# Patient Record
Sex: MALE | Race: WHITE
[De-identification: names, ages, dates, MRNs, and addresses within clinical notes are randomized per-mention and may not be internally consistent; named-entity substitution may affect disease eponyms.]

---

## 2017-04-04 ENCOUNTER — HOSPITAL ENCOUNTER (OUTPATIENT)
Dept: HOSPITAL 45 - C.LAB1850 | Age: 67
Discharge: HOME | End: 2017-04-04
Attending: INTERNAL MEDICINE
Payer: COMMERCIAL

## 2017-04-04 DIAGNOSIS — N40.0: Primary | ICD-10-CM

## 2017-04-04 DIAGNOSIS — Z13.220: ICD-10-CM

## 2017-04-04 DIAGNOSIS — D69.6: ICD-10-CM

## 2017-04-04 DIAGNOSIS — N39.41: ICD-10-CM

## 2017-04-04 DIAGNOSIS — E11.9: ICD-10-CM

## 2017-04-04 LAB
ALBUMIN/GLOB SERPL: 1.2 {RATIO} (ref 0.9–2)
ALP SERPL-CCNC: 51 U/L (ref 45–117)
ALT SERPL-CCNC: 22 U/L (ref 12–78)
ANION GAP SERPL CALC-SCNC: 7 MMOL/L (ref 3–11)
AST SERPL-CCNC: 14 U/L (ref 15–37)
BASOPHILS # BLD: 0.04 K/UL (ref 0–0.2)
BASOPHILS NFR BLD: 0.7 %
BUN SERPL-MCNC: 26 MG/DL (ref 7–18)
BUN/CREAT SERPL: 23.5 (ref 10–20)
CALCIUM SERPL-MCNC: 8.6 MG/DL (ref 8.5–10.1)
CHLORIDE SERPL-SCNC: 110 MMOL/L (ref 98–107)
CHOLEST/HDLC SERPL: 3.1 {RATIO}
CO2 SERPL-SCNC: 26 MMOL/L (ref 21–32)
COMPLETE: YES
CREAT SERPL-MCNC: 1.1 MG/DL (ref 0.6–1.4)
EOSINOPHIL NFR BLD AUTO: 104 K/UL (ref 130–400)
EST. AVERAGE GLUCOSE BLD GHB EST-MCNC: 117 MG/DL
GLOBULIN SER-MCNC: 3.2 GM/DL (ref 2.5–4)
GLUCOSE SERPL-MCNC: 113 MG/DL (ref 70–99)
GLUCOSE UR QL: 54 MG/DL
HCT VFR BLD CALC: 41 % (ref 42–52)
IG%: 0.2 %
IMM GRANULOCYTES NFR BLD AUTO: 24.3 %
KETONES UR QL STRIP: 95 MG/DL
LYMPHOCYTES # BLD: 1.34 K/UL (ref 1.2–3.4)
MCH RBC QN AUTO: 36 PG (ref 25–34)
MCHC RBC AUTO-ENTMCNC: 34.4 G/DL (ref 32–36)
MCV RBC AUTO: 104.6 FL (ref 80–100)
MONOCYTES NFR BLD: 14.1 %
NEUTROPHILS # BLD AUTO: 7.4 %
NEUTROPHILS NFR BLD AUTO: 53.3 %
NITRITE UR QL STRIP: 81 MG/DL (ref 0–150)
PH UR: 165 MG/DL (ref 0–200)
PMV BLD AUTO: 11.1 FL (ref 7.4–10.4)
POTASSIUM SERPL-SCNC: 4.3 MMOL/L (ref 3.5–5.1)
PSA SERPL-MCNC: 0.42 NG/ML (ref 0–4)
RBC # BLD AUTO: 3.92 M/UL (ref 4.7–6.1)
SODIUM SERPL-SCNC: 143 MMOL/L (ref 136–145)
VERY LOW DENSITY LIPOPROT CALC: 16 MG/DL
WBC # BLD AUTO: 5.52 K/UL (ref 4.8–10.8)

## 2017-04-10 ENCOUNTER — HOSPITAL ENCOUNTER (OUTPATIENT)
Dept: HOSPITAL 45 - C.RDSM | Age: 67
Discharge: HOME | End: 2017-04-10
Attending: ORTHOPAEDIC SURGERY
Payer: COMMERCIAL

## 2017-04-10 DIAGNOSIS — M25.562: Primary | ICD-10-CM

## 2017-10-13 ENCOUNTER — HOSPITAL ENCOUNTER (OUTPATIENT)
Dept: HOSPITAL 45 - C.LAB1850 | Age: 67
Discharge: HOME | End: 2017-10-13
Attending: INTERNAL MEDICINE
Payer: COMMERCIAL

## 2017-10-13 DIAGNOSIS — G62.9: ICD-10-CM

## 2017-10-13 DIAGNOSIS — R73.03: Primary | ICD-10-CM

## 2017-10-13 LAB
ALBUMIN/GLOB SERPL: 1.2 {RATIO} (ref 0.9–2)
ALP SERPL-CCNC: 75 U/L (ref 45–117)
ALT SERPL-CCNC: 44 U/L (ref 12–78)
ANION GAP SERPL CALC-SCNC: 9 MMOL/L (ref 3–11)
AST SERPL-CCNC: 32 U/L (ref 15–37)
BASOPHILS # BLD: 0.14 K/UL (ref 0–0.2)
BASOPHILS NFR BLD: 2.3 %
BUN SERPL-MCNC: 20 MG/DL (ref 7–18)
BUN/CREAT SERPL: 18.4 (ref 10–20)
CALCIUM SERPL-MCNC: 9.6 MG/DL (ref 8.5–10.1)
CHLORIDE SERPL-SCNC: 109 MMOL/L (ref 98–107)
CO2 SERPL-SCNC: 23 MMOL/L (ref 21–32)
COMPLETE: YES
CREAT SERPL-MCNC: 1.1 MG/DL (ref 0.6–1.4)
EOSINOPHIL NFR BLD AUTO: 147 K/UL (ref 130–400)
EST. AVERAGE GLUCOSE BLD GHB EST-MCNC: 114 MG/DL
GLOBULIN SER-MCNC: 3.4 GM/DL (ref 2.5–4)
GLUCOSE SERPL-MCNC: 103 MG/DL (ref 70–99)
HCT VFR BLD CALC: 42.7 % (ref 42–52)
IG%: 0.8 %
IMM GRANULOCYTES NFR BLD AUTO: 26.1 %
LYMPHOCYTES # BLD: 1.59 K/UL (ref 1.2–3.4)
MCH RBC QN AUTO: 35.8 PG (ref 25–34)
MCHC RBC AUTO-ENTMCNC: 33.5 G/DL (ref 32–36)
MCV RBC AUTO: 107 FL (ref 80–100)
MONOCYTES NFR BLD: 10.2 %
NEUTROPHILS # BLD AUTO: 6.9 %
NEUTROPHILS NFR BLD AUTO: 53.7 %
PMV BLD AUTO: 10.5 FL (ref 7.4–10.4)
POTASSIUM SERPL-SCNC: 4.2 MMOL/L (ref 3.5–5.1)
RBC # BLD AUTO: 3.99 M/UL (ref 4.7–6.1)
SODIUM SERPL-SCNC: 141 MMOL/L (ref 136–145)
WBC # BLD AUTO: 6.1 K/UL (ref 4.8–10.8)

## 2018-01-10 ENCOUNTER — HOSPITAL ENCOUNTER (EMERGENCY)
Dept: HOSPITAL 45 - C.EDB | Age: 68
Discharge: HOME | End: 2018-01-10
Payer: COMMERCIAL

## 2018-01-10 VITALS — DIASTOLIC BLOOD PRESSURE: 90 MMHG | SYSTOLIC BLOOD PRESSURE: 155 MMHG | OXYGEN SATURATION: 96 % | HEART RATE: 87 BPM

## 2018-01-10 VITALS
BODY MASS INDEX: 30.34 KG/M2 | WEIGHT: 249.12 LBS | HEIGHT: 75.98 IN | WEIGHT: 249.12 LBS | HEIGHT: 75.98 IN | BODY MASS INDEX: 30.34 KG/M2

## 2018-01-10 VITALS — TEMPERATURE: 97.7 F

## 2018-01-10 DIAGNOSIS — Z87.891: ICD-10-CM
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
## 2018-01-10 DIAGNOSIS — E11.9: ICD-10-CM

## 2018-01-10 DIAGNOSIS — S01.01XA: Primary | ICD-10-CM

## 2018-01-10 DIAGNOSIS — F10.920: ICD-10-CM

## 2018-01-10 LAB
ALBUMIN SERPL-MCNC: 4.2 GM/DL (ref 3.4–5)
ALP SERPL-CCNC: 80 U/L (ref 45–117)
ALT SERPL-CCNC: 77 U/L (ref 12–78)
AST SERPL-CCNC: 37 U/L (ref 15–37)
BASOPHILS # BLD: 0.17 K/UL (ref 0–0.2)
BASOPHILS NFR BLD: 1.4 %
BUN SERPL-MCNC: 18 MG/DL (ref 7–18)
CALCIUM SERPL-MCNC: 9.3 MG/DL (ref 8.5–10.1)
CO2 SERPL-SCNC: 26 MMOL/L (ref 21–32)
CREAT SERPL-MCNC: 1.33 MG/DL (ref 0.6–1.4)
EOS ABS #: 0.45 K/UL (ref 0–0.5)
EOSINOPHIL NFR BLD AUTO: 161 K/UL (ref 130–400)
GLUCOSE SERPL-MCNC: 121 MG/DL (ref 70–99)
HCT VFR BLD CALC: 46.3 % (ref 42–52)
HGB BLD-MCNC: 15.7 G/DL (ref 14–18)
IG#: 0.16 K/UL (ref 0–0.02)
IMM GRANULOCYTES NFR BLD AUTO: 20.8 %
LYMPHOCYTES # BLD: 2.54 K/UL (ref 1.2–3.4)
MCH RBC QN AUTO: 37.2 PG (ref 25–34)
MCHC RBC AUTO-ENTMCNC: 33.9 G/DL (ref 32–36)
MCV RBC AUTO: 109.7 FL (ref 80–100)
MONO ABS #: 0.47 K/UL (ref 0.11–0.59)
MONOCYTES NFR BLD: 3.9 %
NEUT ABS #: 8.4 K/UL (ref 1.4–6.5)
NEUTROPHILS # BLD AUTO: 3.7 %
NEUTROPHILS NFR BLD AUTO: 68.9 %
PMV BLD AUTO: 10.7 FL (ref 7.4–10.4)
POTASSIUM SERPL-SCNC: 4.2 MMOL/L (ref 3.5–5.1)
PROT SERPL-MCNC: 8.2 GM/DL (ref 6.4–8.2)
RED CELL DISTRIBUTION WIDTH CV: 14.7 % (ref 11.5–14.5)
RED CELL DISTRIBUTION WIDTH SD: 59.2 FL (ref 36.4–46.3)
SODIUM SERPL-SCNC: 143 MMOL/L (ref 136–145)
WBC # BLD AUTO: 12.19 K/UL (ref 4.8–10.8)

## 2018-01-10 NOTE — EMERGENCY ROOM VISIT NOTE
History


Report prepared by Ana Maria:  Kristina Beltrán


Under the Supervision of:  Dr. Carmina Soler D.O.


First contact with patient:  00:53


Chief Complaint:  FALL


Stated Complaint:  FALL





History of Present Illness


The patient is a 67 year old male who presents to the Emergency Room with 

complaints of an episode of a fall occurring last night. The patient states 

that he fell while going for his daily walk 5 hours ago. He states that he lost 

his footing and couldn't get up because he was too weak. He reports that he 

called his wife to help him get up. The wife states that he was able to walk 

home. She states that she was shocked that he had fallen, but notes he had 

fallen out of bed a month ago. She notes that he was fine, but had skinned his 

knees up. The patient states that he felt fine all day and did not feel weak. 

The wife notes that the patient seemed fine after the fall, but fell again two 

hours ago. She states that he had gotten up from watching TV to use the 

restroom and fell while on the toilet. She reports that she tried to get him up

, but she couldn't this time. The patient denies ever falling off the toilet 

before. He states that he is not sure why he fell off and doesn't remember any 

of it. He notes that he thinks he may have lost consciousness since he hit his 

head so hard. His wife states that if he did it was very brief because she 

heard him fall and immediately went to his aid. She states that he was 

conscious when she arrived. The patient complains of feeling fatigued lately. 

The patient denies walking with a walker, nausea, vomiting, chest pain, 

shortness of breath, and drinking alcohol.





   Source of History:  patient, spouse/significant other


   Onset:  last night


   Position:  other (global)


   Quality:  other (global)


   Timing:  other (episode)


   Associated Symptoms:  + LOC, + fatigue, + weakness, No chest pain, No SOB, 

No nausea, No vomiting





Review of Systems


See HPI for pertinent positives & negatives. A total of 10 systems reviewed and 

were otherwise negative.





Past Medical & Surgical


Medical Problems:


(1) Depression


(2) Diabetes








Family History





No pertinent family history





Social History


Smoking Status:  Former Smoker


Marital Status:  


Housing Status:  lives with significant other





Current/Historical Medications


Scheduled


Dutasteride (Avodart), 0.5 MG PO DAILY


Simvastatin (Zocor), 20 MG PO QPM





Allergies


Coded Allergies:  


     Adhesives (Verified  Allergy, Mild, BANDAIDS - RED ITCH SKIN, 1/10/18)


     Animal Dander (Verified  Allergy, Unknown, HORSE - UNKNOWN, 1/10/18)


     Horse-derived Products (Verified  Allergy, Unknown, UNKNOWN, 1/10/18)


     Latex (Verified  Allergy, Unknown, UNKNOWN, 1/10/18)





Physical Exam


Vital Signs











  Date Time  Temp Pulse Resp B/P (MAP) Pulse Ox O2 Delivery O2 Flow Rate FiO2


 


1/10/18 04:58  87 15 155/90 96   


 


1/10/18 04:06  84      


 


1/10/18 04:01    147/79    


 


1/10/18 04:00  86 15  96   


 


1/10/18 03:31    129/73    


 


1/10/18 03:01    143/75    


 


1/10/18 03:00  87 18  95   


 


1/10/18 02:31    145/83    


 


1/10/18 02:01  89 18 140/89 96 Room Air  


 


1/10/18 02:01    140/89    


 


1/10/18 02:00  86 18  97   


 


1/10/18 00:54  91      


 


1/10/18 00:47 36.5 93 18 151/78 96 Room Air  











Physical Exam


Gen.: The patient is pleasant but seems to smell of alcohol.


HEENT: Head - normocephalic. Large 4 cm laceration on the left parietal region 

of his scalp.  Pupils are equal, round, and reactive to light. Extraocular eye 

muscles are intact and sclera are anicteric.  Ears -  bilaterally patent canals 

with no evidence of hemotympanum.  Nose -  moist nasal mucosa without evidence 

of trauma or discharge. Mouth - moist buccal mucosa with no trauma to the teeth 

or signs of malocclusion.


Neck:  The neck is supple and there is no pain to palpation over the posterior 

cervical spine and no obvious step-offs or deformities.  There is no JVD or 

tracheal deviation.


Chest: There are no signs of deformities, contusions or abrasions to the chest 

wall.  There is no obvious crepitus or paradoxical chest rise.


Heart: Regular, rate, and rhythm.  There is a normal S1 and S2 with no murmurs, 

clicks, or gallops appreciated.


Lungs: Clear to auscultation bilaterally with no wheezes, rales, or rhonchi.


Abdomen: Soft, completely nontender, nondistended, with good bowel sounds.  

There is no sign of trauma such as contusions, abrasions or penetrations.  

There are no palpable pulsatile masses or hepatosplenomegaly.  There is no 

guarding, rigidity, or rebound noted.


Pelvis: Stable to rock and compression.


Extremities: The patient has abrasions to both knees.  There are easily 

palpable peripheral pulses.


Neuro: The patient is awake and alert and easily able to follow commands.  

Muscle strength is 5 out of 5 in all 4 extremities.  Otherwise, neuro exam is 

unremarkable. Slow to answer questions.


Back:  The entire thoracic, lumbar, and sacral spine were palpated.  There are 

no obvious step-offs or deformities noted.  There are no obvious signs of 

trauma such as contusions abrasions penetrations noted to the back.





Medical Decision & Procedures


ER Provider


Diagnostic Interpretation:


Radiology results as stated below per my review and the radiologist's 

interpretation: 





CT HEAD:





No acute intracranial abnormality identified.





Chronic small vessel ischemic disease and cerebral column loss





Bilateral lens implants.





Polyp versus mucous retention cyst in the right maxillary sinus. Mild mucosal 

thickening in the sphenoid sinuses, ethmoid air cells, and frontal sinuses.





Atherosclerotic calcifications in the intracranial vasculature.





Radiologist: Natan Ortiz MD





Study ready at 02:17 and initial results transmitted at 02:53.





Laboratory Results


1/10/18 00:43








Red Blood Count 4.22, Mean Corpuscular Volume 109.7, Mean Corpuscular 

Hemoglobin 37.2, Mean Corpuscular Hemoglobin Concent 33.9, Mean Platelet Volume 

10.7, Neutrophils (%) (Auto) 68.9, Lymphocytes (%) (Auto) 20.8, Monocytes (%) (

Auto) 3.9, Eosinophils (%) (Auto) 3.7, Basophils (%) (Auto) 1.4, Neutrophils # (

Auto) 8.40, Lymphocytes # (Auto) 2.54, Monocytes # (Auto) 0.47, Eosinophils # (

Auto) 0.45, Basophils # (Auto) 0.17





1/10/18 00:43

















Test


  1/10/18


00:43 1/10/18


01:37 1/10/18


01:55


 


White Blood Count


  12.19 K/uL


(4.8-10.8) 


  


 


 


Red Blood Count


  4.22 M/uL


(4.7-6.1) 


  


 


 


Hemoglobin


  15.7 g/dL


(14.0-18.0) 


  


 


 


Hematocrit 46.3 % (42-52)   


 


Mean Corpuscular Volume


  109.7 fL


() 


  


 


 


Mean Corpuscular Hemoglobin


  37.2 pg


(25-34) 


  


 


 


Mean Corpuscular Hemoglobin


Concent 33.9 g/dl


(32-36) 


  


 


 


Platelet Count


  161 K/uL


(130-400) 


  


 


 


Mean Platelet Volume


  10.7 fL


(7.4-10.4) 


  


 


 


Neutrophils (%) (Auto) 68.9 %   


 


Lymphocytes (%) (Auto) 20.8 %   


 


Monocytes (%) (Auto) 3.9 %   


 


Eosinophils (%) (Auto) 3.7 %   


 


Basophils (%) (Auto) 1.4 %   


 


Neutrophils # (Auto)


  8.40 K/uL


(1.4-6.5) 


  


 


 


Lymphocytes # (Auto)


  2.54 K/uL


(1.2-3.4) 


  


 


 


Monocytes # (Auto)


  0.47 K/uL


(0.11-0.59) 


  


 


 


Eosinophils # (Auto)


  0.45 K/uL


(0-0.5) 


  


 


 


Basophils # (Auto)


  0.17 K/uL


(0-0.2) 


  


 


 


RDW Standard Deviation


  59.2 fL


(36.4-46.3) 


  


 


 


RDW Coefficient of Variation


  14.7 %


(11.5-14.5) 


  


 


 


Immature Granulocyte % (Auto) 1.3 %   


 


Immature Granulocyte # (Auto)


  0.16 K/uL


(0.00-0.02) 


  


 


 


Anion Gap


  9.0 mmol/L


(3-11) 


  


 


 


Est Creatinine Clear Calc


Drug Dose 73.1 ml/min 


  


  


 


 


Estimated GFR (


American) 63.2 


  


  


 


 


Estimated GFR (Non-


American 54.5 


  


  


 


 


BUN/Creatinine Ratio 13.9 (10-20)   


 


Calcium Level


  9.3 mg/dl


(8.5-10.1) 


  


 


 


Total Bilirubin


  0.3 mg/dl


(0.2-1) 


  


 


 


Aspartate Amino Transf


(AST/SGOT) 37 U/L (15-37) 


  


  


 


 


Alanine Aminotransferase


(ALT/SGPT) 77 U/L (12-78) 


  


  


 


 


Alkaline Phosphatase


  80 U/L


() 


  


 


 


Total Protein


  8.2 gm/dl


(6.4-8.2) 


  


 


 


Albumin


  4.2 gm/dl


(3.4-5.0) 


  


 


 


Globulin


  4.0 gm/dl


(2.5-4.0) 


  


 


 


Albumin/Globulin Ratio 1.1 (0.9-2)   


 


Thyroid Stimulating Hormone


(TSH) 1.210 uIu/ml


(0.300-4.500) 


  


 


 


Ethyl Alcohol mg/dL


  


  247.0 mg/dl


(0-3) 


 


 


Urine Color   YELLOW 


 


Urine Appearance   CLEAR (CLEAR) 


 


Urine pH   5.0 (4.5-7.5) 


 


Urine Specific Gravity


  


  


  1.010


(1.000-1.030)


 


Urine Protein   NEG (NEG) 


 


Urine Glucose (UA)   NEG (NEG) 


 


Urine Ketones   NEG (NEG) 


 


Urine Occult Blood   NEG (NEG) 


 


Urine Nitrite   NEG (NEG) 


 


Urine Bilirubin   NEG (NEG) 


 


Urine Urobilinogen   NEG (NEG) 


 


Urine Leukocyte Esterase   NEG (NEG) 





Laboratory results per my review.





Procedure


0130: Ordered Lidocaine HCl 20 ml INFIL.





ECG


Indication:  weakness


Rate (beats per minute):  89


Rhythm:  normal sinus


Findings:  no acute ischemic change, no ectopy





ED Course


0118: Past medical records reviewed. The patient was evaluated in room B2. A 

complete history and physical exam was performed.  A twelve-lead EKG was 

obtained as described above.  An IV lock was initiated and labs are drawn as 

above.





0130: Ordered Lidocaine HCl 20 ml INFIL.





0152: I reevaluated the patient and he was doing okay. He was unsure when his 

last Tetanus was.  The patient went for CT scan of the brain.





0215: I reevaluated the patient and he is doing well.  The patient's blood 

alcohol level was elevated.  I confronted him about this.  His wife explains 

that the patient has a history of alcoholism.  The patient explains that he was 

not truthful because he did not want his wife to know that he was drinking





0354: I reevaluated the patient and reviewed his labs with him and his wife. I 

notified them that the PA will be in shortly to staple his laceration.  Please 

see her procedure note dictation





0445: Upon reevaluation, the patient was resting comfortably. I discussed 

findings and results with him. He verbalized agreement of the treatment plan. 

The patient was discharged home.





Medical Decision


The patient is a 67 year old male who presents to the Emergency Room with 

complaints of an episode of a fall occurring last night.





Differential diagnoses include alcohol intoxication, closed head injury, 

intracranial mass, hypoglycemia, hyponatremia, hydrocephalus. 





LABS:





White count 12.2


Stable H&H


Normal renal function


Normal electrolytes


Glucose 121


Normal LFTs


Normal TSH


Urinalysis was negative


Alcohol 247





The patient presented to the emergency department after suffering 2 falls at 

home.  The second one resulted in a laceration to the left side of his head.  

Initially, the patient denied any alcohol use.  A more broad workup was 

performed.  However, the patient blood alcohol level came back at 247.  I 

discussed the possibility of rehabilitation with the patient.  He was not 

interested.  The wound on the head was repaired.  He was allowed some time to 

sober up and then he was discharged home after receiving all of his laboratory 

results.





Medication Reconcilliation


Current Medication List:  was personally reviewed by me





Blood Pressure Screening


Patient's blood pressure:  Elevated blood pressure


Blood pressure disposition:  Elevated BP felt to be situational





Impression





 Primary Impression:  


 Scalp laceration


 Additional Impression:  


 Alcohol intoxication





Scribe Attestation


The scribe's documentation has been prepared under my direction and personally 

reviewed by me in its entirety. I confirm that the note above accurately 

reflects all work, treatment, procedures, and medical decision making performed 

by me.





Departure Information


Dispostion


Home / Self-Care





Referrals


Raymond Reeder M.D. (PCP)





Forms


HOME CARE DOCUMENTATION FORM,                                                 

               IMPORTANT VISIT INFORMATION





Patient Instructions


My [facility]





Additional Instructions








Staples:


Read head injury handout and return for any symptoms.  Keep wound clean and 

dry.  No water on the area for 12-24 hrs then no soaking until staples removed.

  Do not allow any crusting or dried blood to accumulate on staples.  If this 

occurs, use a 1:1 solution of hydrogen peroxide/water on a Q-tip to clean the 

wound.  Use an antibiotic ointment for 3-4 days, then let wound dry.  Staple 

removal in 8 days.  Return sooner for any signs of infection (increasing redness

, swelling, drainage).  Ice and elevate for swelling and pain.     Keep covered 

when in sun until staples removed then SPF 50 or higher for one year.  Vitamin 

E oil if desired two weeks after staple removal for reduction of scar.  





Avoid such excessive alcohol use in the future.





Problem Qualifiers








 Primary Impression:  


 Scalp laceration


 Encounter type:  initial encounter  Qualified Codes:  S01.01XA - Laceration 

without foreign body of scalp, initial encounter


 Additional Impression:  


 Alcohol intoxication


 Complication of substance-induced condition:  uncomplicated  Qualified Codes:  

F10.920 - Alcohol use, unspecified with intoxication, uncomplicated

## 2018-01-10 NOTE — EMERGENCY ROOM VISIT NOTE
ED Visit Note


I was asked to the laceration repair of this patient.





Location: Scalp





Total length: 4cm





Complexity: Simple





Verbal consent was obtained after the risks and benefits were explained, 

including but not limited to bleeding, scarring, infection, pain, and bone/

nerve damage. At this time, the risks of the procedure are less than the risks 

of NOT performing the procedure. A time out was taken and the correct patient 

and site identified. The scalp was prepped with betadine. The target area was 

anesthetized with 3 ml of 1% lidocaine without epinephrine. Copious irrigation 

was performed using saline. The skin was re-prepped with betadine, the hair 

cleared from the wound, and a sterile field set. The wound was explored for 

foreign bodies and none found. Debridement was not performed. The wound edges 

were approximated using 9 surgical staples in the standard fashion. Hemostasis 

and excellent approximation was achieved. Antibacterial ointment and a sterile 

dressing applied. Detailed wound care instructions and signs and symptoms of 

infection reviewed with the pt/wife. No complications and the patient tolerated 

the procedure well.


Current/Historical Medications


Scheduled


Dutasteride (Avodart), 0.5 MG PO DAILY


Simvastatin (Zocor), 20 MG PO QPM





Allergies


Coded Allergies:  


     Adhesives (Verified  Allergy, Mild, BANDAIDS - RED ITCH SKIN, 1/10/18)


     Animal Dander (Verified  Allergy, Unknown, HORSE - UNKNOWN, 1/10/18)


     Horse-derived Products (Verified  Allergy, Unknown, UNKNOWN, 1/10/18)


     Latex (Verified  Allergy, Unknown, UNKNOWN, 1/10/18)





Vital Signs











  Date Time  Temp Pulse Resp B/P (MAP) Pulse Ox O2 Delivery O2 Flow Rate FiO2


 


1/10/18 04:06  84      


 


1/10/18 04:01    147/79    


 


1/10/18 04:00  86 15  96   


 


1/10/18 03:31    129/73    


 


1/10/18 03:01    143/75    


 


1/10/18 03:00  87 18  95   


 


1/10/18 02:31    145/83    


 


1/10/18 02:01  89 18 140/89 96 Room Air  


 


1/10/18 02:01    140/89    


 


1/10/18 02:00  86 18  97   


 


1/10/18 00:54  91      


 


1/10/18 00:47 36.5 93 18 151/78 96 Room Air  











Laboratory Results


1/10/18 00:43








Red Blood Count 4.22, Mean Corpuscular Volume 109.7, Mean Corpuscular 

Hemoglobin 37.2, Mean Corpuscular Hemoglobin Concent 33.9, Mean Platelet Volume 

10.7, Neutrophils (%) (Auto) 68.9, Lymphocytes (%) (Auto) 20.8, Monocytes (%) (

Auto) 3.9, Eosinophils (%) (Auto) 3.7, Basophils (%) (Auto) 1.4, Neutrophils # (

Auto) 8.40, Lymphocytes # (Auto) 2.54, Monocytes # (Auto) 0.47, Eosinophils # (

Auto) 0.45, Basophils # (Auto) 0.17





1/10/18 00:43

















Test


  1/10/18


00:43 1/10/18


01:37 1/10/18


01:55


 


White Blood Count


  12.19 K/uL


(4.8-10.8) 


  


 


 


Red Blood Count


  4.22 M/uL


(4.7-6.1) 


  


 


 


Hemoglobin


  15.7 g/dL


(14.0-18.0) 


  


 


 


Hematocrit 46.3 % (42-52)   


 


Mean Corpuscular Volume


  109.7 fL


() 


  


 


 


Mean Corpuscular Hemoglobin


  37.2 pg


(25-34) 


  


 


 


Mean Corpuscular Hemoglobin


Concent 33.9 g/dl


(32-36) 


  


 


 


Platelet Count


  161 K/uL


(130-400) 


  


 


 


Mean Platelet Volume


  10.7 fL


(7.4-10.4) 


  


 


 


Neutrophils (%) (Auto) 68.9 %   


 


Lymphocytes (%) (Auto) 20.8 %   


 


Monocytes (%) (Auto) 3.9 %   


 


Eosinophils (%) (Auto) 3.7 %   


 


Basophils (%) (Auto) 1.4 %   


 


Neutrophils # (Auto)


  8.40 K/uL


(1.4-6.5) 


  


 


 


Lymphocytes # (Auto)


  2.54 K/uL


(1.2-3.4) 


  


 


 


Monocytes # (Auto)


  0.47 K/uL


(0.11-0.59) 


  


 


 


Eosinophils # (Auto)


  0.45 K/uL


(0-0.5) 


  


 


 


Basophils # (Auto)


  0.17 K/uL


(0-0.2) 


  


 


 


RDW Standard Deviation


  59.2 fL


(36.4-46.3) 


  


 


 


RDW Coefficient of Variation


  14.7 %


(11.5-14.5) 


  


 


 


Immature Granulocyte % (Auto) 1.3 %   


 


Immature Granulocyte # (Auto)


  0.16 K/uL


(0.00-0.02) 


  


 


 


Anion Gap


  9.0 mmol/L


(3-11) 


  


 


 


Est Creatinine Clear Calc


Drug Dose 73.1 ml/min 


  


  


 


 


Estimated GFR (


American) 63.2 


  


  


 


 


Estimated GFR (Non-


American 54.5 


  


  


 


 


BUN/Creatinine Ratio 13.9 (10-20)   


 


Calcium Level


  9.3 mg/dl


(8.5-10.1) 


  


 


 


Total Bilirubin


  0.3 mg/dl


(0.2-1) 


  


 


 


Aspartate Amino Transf


(AST/SGOT) 37 U/L (15-37) 


  


  


 


 


Alanine Aminotransferase


(ALT/SGPT) 77 U/L (12-78) 


  


  


 


 


Alkaline Phosphatase


  80 U/L


() 


  


 


 


Total Protein


  8.2 gm/dl


(6.4-8.2) 


  


 


 


Albumin


  4.2 gm/dl


(3.4-5.0) 


  


 


 


Globulin


  4.0 gm/dl


(2.5-4.0) 


  


 


 


Albumin/Globulin Ratio 1.1 (0.9-2)   


 


Thyroid Stimulating Hormone


(TSH) 1.210 uIu/ml


(0.300-4.500) 


  


 


 


Ethyl Alcohol mg/dL


  


  247.0 mg/dl


(0-3) 


 


 


Urine Color   YELLOW 


 


Urine Appearance   CLEAR (CLEAR) 


 


Urine pH   5.0 (4.5-7.5) 


 


Urine Specific Gravity


  


  


  1.010


(1.000-1.030)


 


Urine Protein   NEG (NEG) 


 


Urine Glucose (UA)   NEG (NEG) 


 


Urine Ketones   NEG (NEG) 


 


Urine Occult Blood   NEG (NEG) 


 


Urine Nitrite   NEG (NEG) 


 


Urine Bilirubin   NEG (NEG) 


 


Urine Urobilinogen   NEG (NEG) 


 


Urine Leukocyte Esterase   NEG (NEG) 











Departure Information


Referrals


Raymond Reeder M.D. (PCP)





Forms


HOME CARE DOCUMENTATION FORM,                                                 

               IMPORTANT VISIT INFORMATION





Patient Instructions


My [facility], ED Head Injury Closed





Additional Instructions








Staples:


Read head injury handout and return for any symptoms.  Keep wound clean and 

dry.  No water on the area for 12-24 hrs then no soaking until staples removed.

  Do not allow any crusting or dried blood to accumulate on staples.  If this 

occurs, use a 1:1 solution of hydrogen peroxide/water on a Q-tip to clean the 

wound.  Use an antibiotic ointment for 3-4 days, then let wound dry.  Staple 

removal in 8 days.  Return sooner for any signs of infection (increasing redness

, swelling, drainage).  Ice and elevate for swelling and pain.     Keep covered 

when in sun until staples removed then SPF 50 or higher for one year.  Vitamin 

E oil if desired two weeks after staple removal for reduction of scar.

## 2018-01-10 NOTE — DIAGNOSTIC IMAGING REPORT
CT SCAN OF THE BRAIN WITHOUT IV CONTRAST



CLINICAL HISTORY: Fall with head injury.



COMPARISON STUDY:  No priors.



TECHNIQUE: Unenhanced axial CT scan of the brain is performed from the vertex to

the skull base. A dose lowering technique was utilized adhering to the

principles of ALARA. 



CT DOSE: 614.27 mGy.cm



FINDINGS:



Brain parenchyma: There are age-related involutional changes noting  mild

subcortical and periventricular microangiopathic change. There is no hemorrhage,

mass effect, or evidence of acute territorial ischemia by CT criteria.

Gray-white matter is preserved. No extra-axial fluid collection is seen.



Ventricles, sulci, cisterns: Prominent secondary to involutional change.



Intracranial vasculature: There is atherosclerotic calcification of the

cavernous carotid arteries.



Calvarium: There is no depressed calvarial fracture.



Sinuses and mastoids: There is mild mucosal thickening within the sphenoid

sinuses. Mild to moderate mucosal thickening is seen within the ethmoid sinuses

that is greatest anteriorly. A retention cyst in the right maxillary antrum

measures up to 1.9 cm. Trace mucosal thickening seen within the maxillary

sinuses. The mastoid air cells are well pneumatized.



Orbits: The bony orbits are grossly intact. There are bilateral ocular lens

implants.





IMPRESSION: There is no hemorrhage, mass effect, or evidence of acute

territorial ischemia by CT criteria.







Electronically signed by:  Bowen Cobos M.D.

1/10/2018 7:07 AM



Dictated Date/Time:  1/10/2018 7:05 AM

## 2018-01-30 ENCOUNTER — HOSPITAL ENCOUNTER (OUTPATIENT)
Dept: HOSPITAL 45 - C.LAB1850 | Age: 68
Discharge: HOME | End: 2018-01-30
Attending: INTERNAL MEDICINE
Payer: COMMERCIAL

## 2018-01-30 DIAGNOSIS — E11.9: ICD-10-CM

## 2018-01-30 DIAGNOSIS — D69.6: ICD-10-CM

## 2018-01-30 DIAGNOSIS — N20.0: ICD-10-CM

## 2018-01-30 DIAGNOSIS — N28.1: ICD-10-CM

## 2018-01-30 DIAGNOSIS — K21.9: Primary | ICD-10-CM

## 2018-01-30 DIAGNOSIS — E78.5: ICD-10-CM

## 2018-01-30 LAB
ALBUMIN SERPL-MCNC: 3.8 GM/DL (ref 3.4–5)
ALP SERPL-CCNC: 57 U/L (ref 45–117)
ALT SERPL-CCNC: 45 U/L (ref 12–78)
AST SERPL-CCNC: 25 U/L (ref 15–37)
BASOPHILS # BLD: 0.02 K/UL (ref 0–0.2)
BASOPHILS NFR BLD: 0.3 %
BUN SERPL-MCNC: 25 MG/DL (ref 7–18)
CALCIUM SERPL-MCNC: 8.3 MG/DL (ref 8.5–10.1)
CO2 SERPL-SCNC: 21 MMOL/L (ref 21–32)
CREAT SERPL-MCNC: 1.33 MG/DL (ref 0.6–1.4)
EOS ABS #: 0.31 K/UL (ref 0–0.5)
EOSINOPHIL NFR BLD AUTO: 143 K/UL (ref 130–400)
GLUCOSE SERPL-MCNC: 111 MG/DL (ref 70–99)
HBA1C MFR BLD: 5.5 % (ref 4.5–5.6)
HCT VFR BLD CALC: 42.8 % (ref 42–52)
HGB BLD-MCNC: 14.3 G/DL (ref 14–18)
IG#: 0.03 K/UL (ref 0–0.02)
IMM GRANULOCYTES NFR BLD AUTO: 24.9 %
KETONES UR QL STRIP: 113 MG/DL
LYMPHOCYTES # BLD: 1.65 K/UL (ref 1.2–3.4)
MCH RBC QN AUTO: 36.3 PG (ref 25–34)
MCHC RBC AUTO-ENTMCNC: 33.4 G/DL (ref 32–36)
MCV RBC AUTO: 108.6 FL (ref 80–100)
MONO ABS #: 0.79 K/UL (ref 0.11–0.59)
MONOCYTES NFR BLD: 11.9 %
NEUT ABS #: 3.83 K/UL (ref 1.4–6.5)
NEUTROPHILS # BLD AUTO: 4.7 %
NEUTROPHILS NFR BLD AUTO: 57.7 %
PH UR: 174 MG/DL (ref 0–200)
PMV BLD AUTO: 10.9 FL (ref 7.4–10.4)
POTASSIUM SERPL-SCNC: 4 MMOL/L (ref 3.5–5.1)
PROT SERPL-MCNC: 7.3 GM/DL (ref 6.4–8.2)
RED CELL DISTRIBUTION WIDTH CV: 14.4 % (ref 11.5–14.5)
RED CELL DISTRIBUTION WIDTH SD: 57.7 FL (ref 36.4–46.3)
SODIUM SERPL-SCNC: 138 MMOL/L (ref 136–145)
WBC # BLD AUTO: 6.63 K/UL (ref 4.8–10.8)

## 2018-02-05 ENCOUNTER — HOSPITAL ENCOUNTER (OUTPATIENT)
Dept: HOSPITAL 45 - C.RAD1850 | Age: 68
Discharge: HOME | End: 2018-02-05
Attending: INTERNAL MEDICINE
Payer: COMMERCIAL

## 2018-02-05 DIAGNOSIS — M25.461: ICD-10-CM

## 2018-02-05 DIAGNOSIS — M25.561: Primary | ICD-10-CM

## 2018-02-05 DIAGNOSIS — M89.8X6: ICD-10-CM

## 2018-02-05 NOTE — DIAGNOSTIC IMAGING REPORT
R KNEE 1 OR 2 VIEWS ROUTINE



CLINICAL HISTORY: 67 years-old Male presenting with M25.561 Right knee

[id number]. 



TECHNIQUE: Frontal and lateral views of the right knee were obtained. 



COMPARISON: Comparison made to plain radiographs of the left knee from

4/10/2017.



FINDINGS:

Medial joint space loss and subchondral sclerosis, slightly increased from prior

radiographs. Mild osteophytosis in the lateral and patellofemoral compartments.

Prominent these of height at the insertion of the quadriceps tendon. Small knee

joint effusion suspected. No acute fracture or malalignment.



IMPRESSION:

1.  Tricompartmental degenerative changes with medial joint space loss.



2.  Small knee joint effusion.



3.  No acute osseous injury.







Electronically signed by:  John Swift M.D.

2/5/2018 4:13 PM



Dictated Date/Time:  2/5/2018 4:12 PM

## 2018-05-17 ENCOUNTER — HOSPITAL ENCOUNTER (OUTPATIENT)
Dept: HOSPITAL 45 - C.LAB1850 | Age: 68
Discharge: HOME | End: 2018-05-17
Attending: INTERNAL MEDICINE
Payer: COMMERCIAL

## 2018-05-17 DIAGNOSIS — E78.5: ICD-10-CM

## 2018-05-17 DIAGNOSIS — E11.9: Primary | ICD-10-CM

## 2018-05-17 LAB
ALT SERPL-CCNC: 49 U/L (ref 12–78)
AST SERPL-CCNC: 35 U/L (ref 15–37)
BUN SERPL-MCNC: 24 MG/DL (ref 7–18)
CALCIUM SERPL-MCNC: 9 MG/DL (ref 8.5–10.1)
CO2 SERPL-SCNC: 27 MMOL/L (ref 21–32)
CREAT SERPL-MCNC: 1.27 MG/DL (ref 0.6–1.4)
GLUCOSE SERPL-MCNC: 122 MG/DL (ref 70–99)
HBA1C MFR BLD: 5.7 % (ref 4.5–5.6)
KETONES UR QL STRIP: 143 MG/DL
PH UR: 226 MG/DL (ref 0–200)
POTASSIUM SERPL-SCNC: 3.9 MMOL/L (ref 3.5–5.1)
SODIUM SERPL-SCNC: 139 MMOL/L (ref 136–145)